# Patient Record
Sex: MALE | Race: OTHER | HISPANIC OR LATINO | URBAN - METROPOLITAN AREA
[De-identification: names, ages, dates, MRNs, and addresses within clinical notes are randomized per-mention and may not be internally consistent; named-entity substitution may affect disease eponyms.]

---

## 2022-10-22 ENCOUNTER — EMERGENCY (EMERGENCY)
Facility: HOSPITAL | Age: 32
LOS: 1 days | Discharge: HOME | End: 2022-10-22
Attending: STUDENT IN AN ORGANIZED HEALTH CARE EDUCATION/TRAINING PROGRAM
Payer: SELF-PAY

## 2022-10-22 VITALS
SYSTOLIC BLOOD PRESSURE: 143 MMHG | DIASTOLIC BLOOD PRESSURE: 93 MMHG | RESPIRATION RATE: 14 BRPM | HEART RATE: 77 BPM | TEMPERATURE: 98 F | OXYGEN SATURATION: 97 %

## 2022-10-22 LAB
ALBUMIN SERPL ELPH-MCNC: 4.3 G/DL — SIGNIFICANT CHANGE UP (ref 3.3–5)
ALP SERPL-CCNC: 127 U/L — HIGH (ref 40–120)
ALT FLD-CCNC: 43 U/L — SIGNIFICANT CHANGE UP (ref 10–45)
ANION GAP SERPL CALC-SCNC: 14 MMOL/L — SIGNIFICANT CHANGE UP (ref 5–17)
APAP SERPL-MCNC: <15 UG/ML — SIGNIFICANT CHANGE UP (ref 10–30)
AST SERPL-CCNC: 69 U/L — HIGH (ref 10–40)
BASE EXCESS BLDV CALC-SCNC: 4.7 MMOL/L — HIGH (ref -2–3)
BASOPHILS # BLD AUTO: 0.04 K/UL — SIGNIFICANT CHANGE UP (ref 0–0.2)
BASOPHILS NFR BLD AUTO: 0.9 % — SIGNIFICANT CHANGE UP (ref 0–2)
BILIRUB SERPL-MCNC: 0.3 MG/DL — SIGNIFICANT CHANGE UP (ref 0.2–1.2)
BLOOD GAS VENOUS - CREATININE: SIGNIFICANT CHANGE UP MG/DL (ref 0.5–1.3)
BUN SERPL-MCNC: 8 MG/DL — SIGNIFICANT CHANGE UP (ref 7–23)
CA-I SERPL-SCNC: 1.05 MMOL/L — LOW (ref 1.15–1.33)
CALCIUM SERPL-MCNC: 8.7 MG/DL — SIGNIFICANT CHANGE UP (ref 8.4–10.5)
CHLORIDE BLDV-SCNC: 108 MMOL/L — SIGNIFICANT CHANGE UP (ref 96–108)
CHLORIDE SERPL-SCNC: 105 MMOL/L — SIGNIFICANT CHANGE UP (ref 96–108)
CO2 BLDV-SCNC: 31 MMOL/L — HIGH (ref 22–26)
CO2 SERPL-SCNC: 28 MMOL/L — SIGNIFICANT CHANGE UP (ref 22–31)
CREAT SERPL-MCNC: 0.77 MG/DL — SIGNIFICANT CHANGE UP (ref 0.5–1.3)
EGFR: 120 ML/MIN/1.73M2 — SIGNIFICANT CHANGE UP
EOSINOPHIL # BLD AUTO: 0 K/UL — SIGNIFICANT CHANGE UP (ref 0–0.5)
EOSINOPHIL NFR BLD AUTO: 0 % — SIGNIFICANT CHANGE UP (ref 0–6)
ETHANOL SERPL-MCNC: 464 MG/DL — HIGH (ref 0–10)
GAS PNL BLDV: 147 MMOL/L — HIGH (ref 136–145)
GAS PNL BLDV: SIGNIFICANT CHANGE UP
GAS PNL BLDV: SIGNIFICANT CHANGE UP
GLUCOSE BLDV-MCNC: 96 MG/DL — SIGNIFICANT CHANGE UP (ref 70–99)
GLUCOSE SERPL-MCNC: 98 MG/DL — SIGNIFICANT CHANGE UP (ref 70–99)
HCO3 BLDV-SCNC: 30 MMOL/L — HIGH (ref 22–29)
HCT VFR BLD CALC: 41.9 % — SIGNIFICANT CHANGE UP (ref 39–50)
HCT VFR BLDA CALC: 45 % — SIGNIFICANT CHANGE UP (ref 39–51)
HGB BLD CALC-MCNC: 14.9 G/DL — SIGNIFICANT CHANGE UP (ref 12.6–17.4)
HGB BLD-MCNC: 14.3 G/DL — SIGNIFICANT CHANGE UP (ref 13–17)
LACTATE BLDV-MCNC: 2.2 MMOL/L — HIGH (ref 0.5–2)
LYMPHOCYTES # BLD AUTO: 3.41 K/UL — HIGH (ref 1–3.3)
LYMPHOCYTES # BLD AUTO: 71.3 % — HIGH (ref 13–44)
MANUAL SMEAR VERIFICATION: SIGNIFICANT CHANGE UP
MCHC RBC-ENTMCNC: 31.6 PG — SIGNIFICANT CHANGE UP (ref 27–34)
MCHC RBC-ENTMCNC: 34.1 GM/DL — SIGNIFICANT CHANGE UP (ref 32–36)
MCV RBC AUTO: 92.5 FL — SIGNIFICANT CHANGE UP (ref 80–100)
MONOCYTES # BLD AUTO: 0.42 K/UL — SIGNIFICANT CHANGE UP (ref 0–0.9)
MONOCYTES NFR BLD AUTO: 8.7 % — SIGNIFICANT CHANGE UP (ref 2–14)
NEUTROPHILS # BLD AUTO: 0.91 K/UL — LOW (ref 1.8–7.4)
NEUTROPHILS NFR BLD AUTO: 19.1 % — LOW (ref 43–77)
PCO2 BLDV: 45 MMHG — SIGNIFICANT CHANGE UP (ref 42–55)
PH BLDV: 7.43 — SIGNIFICANT CHANGE UP (ref 7.32–7.43)
PLAT MORPH BLD: NORMAL — SIGNIFICANT CHANGE UP
PLATELET # BLD AUTO: 327 K/UL — SIGNIFICANT CHANGE UP (ref 150–400)
PO2 BLDV: 76 MMHG — HIGH (ref 25–45)
POTASSIUM BLDV-SCNC: 3.7 MMOL/L — SIGNIFICANT CHANGE UP (ref 3.5–5.1)
POTASSIUM SERPL-MCNC: 3.7 MMOL/L — SIGNIFICANT CHANGE UP (ref 3.5–5.3)
POTASSIUM SERPL-SCNC: 3.7 MMOL/L — SIGNIFICANT CHANGE UP (ref 3.5–5.3)
PROT SERPL-MCNC: 8.2 G/DL — SIGNIFICANT CHANGE UP (ref 6–8.3)
RBC # BLD: 4.53 M/UL — SIGNIFICANT CHANGE UP (ref 4.2–5.8)
RBC # FLD: 12.4 % — SIGNIFICANT CHANGE UP (ref 10.3–14.5)
RBC BLD AUTO: SIGNIFICANT CHANGE UP
SALICYLATES SERPL-MCNC: <2 MG/DL — LOW (ref 15–30)
SAO2 % BLDV: 94.8 % — HIGH (ref 67–88)
SODIUM SERPL-SCNC: 147 MMOL/L — HIGH (ref 135–145)
WBC # BLD: 4.78 K/UL — SIGNIFICANT CHANGE UP (ref 3.8–10.5)
WBC # FLD AUTO: 4.78 K/UL — SIGNIFICANT CHANGE UP (ref 3.8–10.5)

## 2022-10-22 PROCEDURE — 70450 CT HEAD/BRAIN W/O DYE: CPT | Mod: 26,MA

## 2022-10-22 PROCEDURE — 70450 CT HEAD/BRAIN W/O DYE: CPT | Mod: MA

## 2022-10-22 PROCEDURE — 85018 HEMOGLOBIN: CPT

## 2022-10-22 PROCEDURE — 84295 ASSAY OF SERUM SODIUM: CPT

## 2022-10-22 PROCEDURE — U0005: CPT

## 2022-10-22 PROCEDURE — 82947 ASSAY GLUCOSE BLOOD QUANT: CPT

## 2022-10-22 PROCEDURE — 80307 DRUG TEST PRSMV CHEM ANLYZR: CPT

## 2022-10-22 PROCEDURE — 84132 ASSAY OF SERUM POTASSIUM: CPT

## 2022-10-22 PROCEDURE — 71045 X-RAY EXAM CHEST 1 VIEW: CPT

## 2022-10-22 PROCEDURE — 99285 EMERGENCY DEPT VISIT HI MDM: CPT

## 2022-10-22 PROCEDURE — U0003: CPT

## 2022-10-22 PROCEDURE — 80053 COMPREHEN METABOLIC PANEL: CPT

## 2022-10-22 PROCEDURE — 72170 X-RAY EXAM OF PELVIS: CPT

## 2022-10-22 PROCEDURE — 83605 ASSAY OF LACTIC ACID: CPT

## 2022-10-22 PROCEDURE — 85014 HEMATOCRIT: CPT

## 2022-10-22 PROCEDURE — 82962 GLUCOSE BLOOD TEST: CPT

## 2022-10-22 PROCEDURE — 82803 BLOOD GASES ANY COMBINATION: CPT

## 2022-10-22 PROCEDURE — 82435 ASSAY OF BLOOD CHLORIDE: CPT

## 2022-10-22 PROCEDURE — 82330 ASSAY OF CALCIUM: CPT

## 2022-10-22 PROCEDURE — 82565 ASSAY OF CREATININE: CPT

## 2022-10-22 PROCEDURE — 85025 COMPLETE CBC W/AUTO DIFF WBC: CPT

## 2022-10-23 VITALS
OXYGEN SATURATION: 97 % | DIASTOLIC BLOOD PRESSURE: 66 MMHG | HEART RATE: 95 BPM | TEMPERATURE: 98 F | RESPIRATION RATE: 16 BRPM | SYSTOLIC BLOOD PRESSURE: 106 MMHG

## 2022-10-23 LAB — SARS-COV-2 RNA SPEC QL NAA+PROBE: SIGNIFICANT CHANGE UP

## 2022-10-23 PROCEDURE — 72170 X-RAY EXAM OF PELVIS: CPT | Mod: 26

## 2022-10-23 PROCEDURE — 71045 X-RAY EXAM CHEST 1 VIEW: CPT | Mod: 26

## 2022-10-23 NOTE — ED PROVIDER NOTE - PATIENT PORTAL LINK FT
You can access the FollowMyHealth Patient Portal offered by [facility] by registering at the following website: [email or web address]. By joining [employer]’s FollowMyHealth portal, you will also be able to view your health information using other applications (apps) compatible with our system.

## 2022-10-23 NOTE — ED ADULT NURSE REASSESSMENT NOTE - NS ED NURSE REASSESS COMMENT FT1
RN note not completed by previous RN. As per report patient was found sleeping [place] only responsive to pain due to alcohol ingestion, [place]inamese speaking only. Patient not at bedside when attempted to be assessed by RN. Walked out by other RN who states patient had no IV.

## 2022-10-23 NOTE — ED PROVIDER NOTE - NSFOLLOWUPINSTRUCTIONS_ED_ALL_ED_FT
Alcohol Intoxication  WHAT YOU NEED TO KNOW:  Alcohol intoxication is a harmful physical condition caused when you drink more alcohol than your body can handle. It is also called ethanol poisoning, or being drunk.  DISCHARGE INSTRUCTIONS:  Call your local emergency number (911 in the [place]) if:   •You have sudden trouble breathing or chest pain.  •You have a seizure.  •You feel sad enough to harm yourself or others.  Call your doctor if:   •You have hallucinations (you see or hear things that are not real).  •You cannot stop vomiting.  •You have questions or concerns about your condition or care.  Recommended alcohol limits:   •Men 21 to 64 years should limit alcohol to 2 drinks a day. Do not have more than 4 drinks in 1 day or more than 14 in 1 week.  •All women, and men 65 or older should limit alcohol to 1 drink in a day. Do not have more than 3 drinks in 1 day or more than 7 in 1 week. No amount of alcohol is okay during pregnancy.  Manage alcohol use:   •Decrease the amount you drink. This can help prevent health problems such as brain, heart, and liver damage, high blood pressure, diabetes, and cancer. If you cannot stop completely, healthcare providers can help you set goals to decrease the amount you drink.  •Plan weekly alcohol use. You will be less likely to drink more than the recommended limit if you plan ahead.  •Have food when you drink alcohol. Food will prevent alcohol from getting into your system too quickly. Eat before you have your first alcohol drink.  •Time your drinks carefully. Have no more than 1 drink in an hour. Have a liquid such as water, coffee, or a soft drink between alcohol drinks.  •Do not drive if you have had alcohol. Make sure someone who has not been drinking can help you get home.  •Do not drink alcohol if you are taking medicine. Alcohol is dangerous when you combine it with certain medicines, such as acetaminophen or blood pressure medicine. Talk to your healthcare provider about all the medicines you currently take.  For more information:   •Alcoholics Anonymous  Web Address: http://www.aa.org  •Substance Abuse and Mental Health Services Administration  [address]  [place] [address]  Web Address: http://www.sama.gov  Follow up with your healthcare provider as directed: Write down your questions so you remember to ask them during your visits.   Intoxicación de alcohol  LO QUE NECESITA SABER:  La intoxicación por alcohol es jenna condición física dañina causada cuando usted dennys más alcohol de lo que coombs cuerpo puede manejar. También se llama envenenamiento por etanol o estado ebrio.  INSTRUCCIONES SOBRE EL HAIM HOSPITALARIA:  Llame al número de emergencias local (911 en los [place]) si:  •Tiene dolor en el pecho o dificultad repentina para respirar.  •Usted sufre jenna convulsión.  •Usted se siente suficientemente arnold tona para lastimarse o lastimar a otras personas.  Llame a coombs médico si:  •Usted tiene alucinaciones (ve o escucha cosas que no son reales).  •Usted no puede dejar de vomitar.  •Usted tiene preguntas o inquietudes acerca de coombs condición o cuidado.  Límites recomendados de alcohol:  •Los hombres de 21 a 64 añosdeberían limitar el consumo de alcohol a 2 tragos al día. No tome más de 4 bebidas por día o más de 14 en jenna semana.•Todos los hombres y las mujeres de 65 años o másdeberían limitar el consumo de alcohol a 1 trago por día. No tome más de 3 bebidas por día o más de 7 en jenna semana. Ninguna cantidad de alcohol se considera adecuada bob el embarazo.  Maneje el consumo de alcohol:  •Disminuya la cantidad que dennys.Halbur puede ayudar a prevenir problemas de дмитрий, tona daño cerebral, cardíaco y hepático, presión arterial haim, diabetes y cáncer. Si usted no puede dejar de beber por completo, los proveedores de atención médica pueden ayudarlo a establecer metas para disminuir la cantidad que usted dennys.  •Planifique el uso semanal de alcohol.Es menos probable que vidal más de lo recomendado si lo planifica con anticipación.  •Cuando vidal alcohol, acompáñelo con comida.La comida evitará que el alcohol entre en coombs sistema demasiado rápido. Coma antes de tatyana coombs primera bebida alcohólica.  •Calcule con cuidado el tiempo de kendy bebidas.No tome más de jenna bebida por hora. Kaycee líquido, tona agua, café o un refresco, entre las bebidas alcohólicas.  •No maneje si ha tomado alcohol.Asegúrese que alguien que no haya estado bebiendo lo pueda llevar a casa.  •No vidal alcohol si está tomando pearl medicamento.El alcohol es peligroso cuando se combina con ciertos medicamentos, tona acetaminofeno o un medicamento para la presión arterial. Hable con coombs médico acerca de todos los medicamentos que está tomando.  Para más información:  •Alcoholics Anonymous  Web Address: http://www.aa.org  •Substance Abuse and Mental Health Services Administration  [address]  [place] [address]  Web Address: http://www.samhsa.gov  Acuda a kendy consultas de control con coombs médico según le indicaron.Anote kendy preguntas para que se acuerde de hacerlas bob kendy visitas.

## 2022-10-23 NOTE — ED PROVIDER NOTE - PROGRESS NOTE DETAILS
Rekha Santos MD (PGY2): A&0x3, ambulatory, po challenge passed. Labs and imaging studies non-actionable.

## 2022-10-23 NOTE — ED PROVIDER NOTE - CLINICAL SUMMARY MEDICAL DECISION MAKING FREE TEXT BOX
31 y/o m pted with c/f etoh intox. Found by EMS after bystander call. No interventions given en route. vitals stable. exam w/ sluggish pupils. Will get labs, tox screen, ct head, cxr, xr pelvis.

## 2022-10-23 NOTE — ED PROVIDER NOTE - OBJECTIVE STATEMENT
Patient is a 33 y/o M PTED via EMS Patient is a 33 y/o M PTED via EMS after being found by a Afshan Hein. Bystander called, no one at scene. Patient mildly responsive to EMS. No interventions given en route. In ED, patient somewhat responsive to movement and painful stimuli.

## 2022-10-23 NOTE — ED PROVIDER NOTE - PHYSICAL EXAMINATION
General: responsive to pain, opens eyes.   Head: Normocephalic and atraumatic.  Eyes: PERRLA with EOMI.  Neck: Supple. Trachea midline.   Cardiac: Normal S1 and S2 w/ RRR. No murmurs appreciated. No JVD appreciated.  Pulmonary: CTA bilaterally. No increased WOB. No wheezes or crackles.  Abdominal: Soft, non-tender. (+) bowel sounds appreciated in all 4 quadrants. No hepatosplenomegaly.   Neurologic: No focal sensory or motor deficits.  Musculoskeletal: Strength appropriate in all 4 extremities for age with no limited ROM.  Skin: Color appropriate for race. Intact, warm, and well-perfused.  Psychiatric: Appropriate mood and affect. No apparent risk to self or others. General: responsive to pain, opens eyes.   Head: Normocephalic and atraumatic.  Eyes: sluggish pupils  Neck: Supple. Trachea midline.   Cardiac: Normal S1 and S2 w/ RRR. No murmurs appreciated. No JVD appreciated.  Pulmonary: CTA bilaterally. No increased WOB. No wheezes or crackles.  Abdominal: Soft, non-tender. (+) bowel sounds appreciated in all 4 quadrants. No hepatosplenomegaly.   Musculoskeletal: pulses intact, no edema.   Skin: Color appropriate for race. Intact, warm, and well-perfused.